# Patient Record
Sex: MALE | Race: BLACK OR AFRICAN AMERICAN
[De-identification: names, ages, dates, MRNs, and addresses within clinical notes are randomized per-mention and may not be internally consistent; named-entity substitution may affect disease eponyms.]

---

## 2018-07-22 ENCOUNTER — HOSPITAL ENCOUNTER (EMERGENCY)
Dept: HOSPITAL 25 - UCEAST | Age: 38
Discharge: HOME | End: 2018-07-22
Payer: SELF-PAY

## 2018-07-22 VITALS — SYSTOLIC BLOOD PRESSURE: 150 MMHG | DIASTOLIC BLOOD PRESSURE: 99 MMHG

## 2018-07-22 DIAGNOSIS — F17.200: ICD-10-CM

## 2018-07-22 DIAGNOSIS — R25.1: ICD-10-CM

## 2018-07-22 DIAGNOSIS — F12.90: Primary | ICD-10-CM

## 2018-07-22 PROCEDURE — 99211 OFF/OP EST MAY X REQ PHY/QHP: CPT

## 2018-07-22 PROCEDURE — 93005 ELECTROCARDIOGRAM TRACING: CPT

## 2018-07-22 PROCEDURE — 81003 URINALYSIS AUTO W/O SCOPE: CPT

## 2018-07-22 PROCEDURE — G0463 HOSPITAL OUTPT CLINIC VISIT: HCPCS

## 2018-08-13 NOTE — UC
UC General HPI





- HPI Summary


HPI Summary: 


Patient feeling shakey and uncomfortable after smoking cannabis 








- History of Current Complaint


Chief Complaint: UCGeneralIllness


Stated Complaint: SHAKEY


Time Seen by Provider: 07/22/18 17:37


Hx Obtained From: Patient


Onset/Duration: Sudden Onset, Lasting Hours - 3


Timing: Constant


Pain Intensity: 0





- Allergy/Home Medications


Allergies/Adverse Reactions: 


 Allergies











Allergy/AdvReac Type Severity Reaction Status Date / Time


 


No Known Allergies Allergy   Verified 07/24/15 16:12














PMH/Surg Hx/FS Hx/Imm Hx


Previously Healthy: Yes





- Surgical History


Surgical History: None





- Family History


Known Family History: Positive: None





- Social History


Occupation: Employed Full-time


Lives: With Family


Alcohol Use: Weekly


Substance Use Type: Synthetic Drugs


Substance Use Comment - Amount & Last Used: MDA used at 1300


Smoking Status (MU): Heavy Every Day Tobacco Smoker





Review of Systems


Constitutional: Negative


Skin: Negative


Eyes: Negative


ENT: Negative


Respiratory: Negative


Cardiovascular: Negative


Gastrointestinal: Negative


Genitourinary: Negative


Motor: Negative


Neurovascular: Negative


Musculoskeletal: Negative


Neurological: Negative, Other - shakey and uncomfortanle


Psychological: Negative


Is Patient Immunocompromised?: No


All Other Systems Reviewed And Are Negative: Yes





Physical Exam


Triage Information Reviewed: Yes


Appearance: Well-Appearing, No Pain Distress, Well-Nourished


Vital Signs: 


 Initial Vital Signs











Temp  101.0 F   07/22/18 17:06


 


Pulse  93   07/22/18 17:06


 


Resp  16   07/22/18 17:06


 


BP  150/99   07/22/18 17:06


 


Pulse Ox  99   07/22/18 17:06











Vital Signs Reviewed: Yes


Eye Exam: Normal


Eyes: Positive: Conjunctiva Clear


ENT Exam: Normal


ENT: Positive: Normal ENT inspection, Hearing grossly normal, Pharynx normal, 

TMs normal.  Negative: Nasal congestion, Trismus, Muffled voice, Hoarse voice


Dental Exam: Normal


Neck exam: Normal


Neck: Positive: Supple, Nontender, No Lymphadenopathy


Respiratory Exam: Normal


Respiratory: Positive: Chest non-tender, Lungs clear, Normal breath sounds, No 

respiratory distress, No accessory muscle use


Cardiovascular Exam: Normal


Cardiovascular: Positive: RRR, No Murmur, Pulses Normal, Brisk Capillary Refill


Abdominal Exam: Normal


Abdomen Description: Positive: Nontender, No Organomegaly, Soft.  Negative: CVA 

Tenderness (R), CVA Tenderness (L)


Bowel Sounds: Positive: Present


Musculoskeletal Exam: Normal


Musculoskeletal: Positive: Strength Intact, ROM Intact, No Edema


Neurological Exam: Normal


Neurological: Positive: Alert, Muscle Tone Normal


Psychological Exam: Normal


Skin Exam: Normal





Diagnostics





- EKG


Cardiac Rate: NL


Cardiac Rhythm: Sinus: Normal


Ectopy: None





Course/Dx





- Course


Course Of Treatment: feels better after po fluids and rest will d/c to home and 

follow with pcp prn





- Differential Dx - Multi-Symptom


Provider Diagnoses: illicit substance use





Discharge





- Sign-Out/Discharge


Documenting (check all that apply): Patient Departure





- Discharge Plan


Condition: Good


Disposition: HOME


Patient Education Materials:  Polysubstance Abuse (ED), Adult Overdose (ED)


Referrals: 


Walt Blanton MD [Primary Care Provider] - 1 Week


Additional Instructions: 


Increase fluids---follow with PCP





- Billing Disposition and Condition


Condition: GOOD


Disposition: Home